# Patient Record
Sex: FEMALE | Race: WHITE | NOT HISPANIC OR LATINO | Employment: UNEMPLOYED | URBAN - METROPOLITAN AREA
[De-identification: names, ages, dates, MRNs, and addresses within clinical notes are randomized per-mention and may not be internally consistent; named-entity substitution may affect disease eponyms.]

---

## 2023-12-28 ENCOUNTER — APPOINTMENT (OUTPATIENT)
Dept: RADIOLOGY | Facility: HOSPITAL | Age: 58
End: 2023-12-28
Attending: STUDENT IN AN ORGANIZED HEALTH CARE EDUCATION/TRAINING PROGRAM
Payer: COMMERCIAL

## 2023-12-28 ENCOUNTER — HOSPITAL ENCOUNTER (EMERGENCY)
Facility: HOSPITAL | Age: 58
Discharge: HOME | End: 2023-12-28
Attending: EMERGENCY MEDICINE | Admitting: EMERGENCY MEDICINE
Payer: COMMERCIAL

## 2023-12-28 VITALS
HEART RATE: 117 BPM | WEIGHT: 262 LBS | TEMPERATURE: 98.8 F | RESPIRATION RATE: 20 BRPM | SYSTOLIC BLOOD PRESSURE: 142 MMHG | DIASTOLIC BLOOD PRESSURE: 82 MMHG | OXYGEN SATURATION: 96 %

## 2023-12-28 DIAGNOSIS — J10.1 INFLUENZA A: ICD-10-CM

## 2023-12-28 LAB
FLUAV RNA SPEC QL NAA+PROBE: POSITIVE
FLUBV RNA RESP QL NAA+PROBE: NEGATIVE
RSV RNA SPEC NAA+PROBE: NEGATIVE
SARS-COV-2 RNA RESP QL NAA+PROBE: NEGATIVE

## 2023-12-28 PROCEDURE — 99284 EMERGENCY DEPT VISIT MOD MDM: CPT | Mod: 25

## 2023-12-28 PROCEDURE — 71046 X-RAY EXAM CHEST 2 VIEWS: CPT

## 2023-12-28 PROCEDURE — 87637 SARSCOV2&INF A&B&RSV AMP PRB: CPT | Performed by: EMERGENCY MEDICINE

## 2023-12-28 RX ORDER — BENZONATATE 100 MG/1
100 CAPSULE ORAL 3 TIMES DAILY PRN
Qty: 15 CAPSULE | Refills: 0 | Status: SHIPPED | OUTPATIENT
Start: 2023-12-28 | End: 2024-01-02

## 2023-12-28 NOTE — DISCHARGE INSTRUCTIONS
You were seen today in the emergency department for your symptoms.  Your chest x-ray was reassuring.  Your laboratory test was positive for influenza A.      Pain Management at Home    You can take ibuprofen (Motrin/Advil) as needed. Take ibuprofen with food to avoid an upset stomach. You can also take famotidine (Pepcid) if you feel like the ibuprofen is giving you heartburn. If you prefer, you can substitute naproxen (Aleve/Naprosyn) for the ibuprofen. Do not take naproxen while you are taking ibuprofen. Do not take ibuprofen if you are already taking naproxen. All of these medicines are available over the counter.    You can take 600 mg of Ibuprofen (Motrin, Advil) by mouth with food every 6 to 8 hours. The maximum dose of ibuprofen is 3200 mg in a 24 hour period. Please do not take more than 3200 mg in 24 hours. Taking this medication with food can help to prevent stomach irritation. With long-term use this medication can irritate the stomach causing pain and can lead to development of a stomach ulcer. If you notice stomach pain or vomiting of coffee-ground colored vomit or blood, please be seen by a healthcare provider. Attempt to use this medication for the shortest time possible.     You can take Tessalon Pearls as needed up to 3 times per day for cough.     Follow-up with your primary care for close recheck.  Return to the emergency department if you develop any new or worsening symptoms including but not limited to fevers, coughing up blood, vomiting up blood, unable to keep food or fluids down, chest pain, difficulty breathing.

## 2023-12-28 NOTE — ED TRIAGE NOTES
"New diabetic, on oral meds, \"got sick after daniel\" with painful productive cough, chest and abdominal tenderness from coughing all night; last took mucinex this am. Pt  went to clinic and was put on Zpak and prednisone.      Triage Assessment (Adult)       Row Name 12/28/23 1520          Triage Assessment    Airway WDL WDL        Respiratory WDL    Respiratory WDL X  productive cough since 12/26; pt  on zpack        Skin Circulation/Temperature WDL    Skin Circulation/Temperature WDL WDL        Cardiac WDL    Cardiac WDL X          Peripheral/Neurovascular WDL    Peripheral Neurovascular WDL WDL        Cognitive/Neuro/Behavioral WDL    Cognitive/Neuro/Behavioral WDL WDL                     "

## 2023-12-29 NOTE — ED PROVIDER NOTES
Emergency Department Midlevel Supervisory Note     I personally saw the patient and performed a substantive portion of the visit including all aspects of the medical decision making.    ED Course:  6:09 PM Anali Garcia PA-C staffed patient with me. I agree with their assessment and plan of management, and I will see the patient.  6:11 PM I met with the patient to introduce myself, gather additional history, perform my initial exam, and discuss the plan.     Brief HPI:     Chelsie Herrera is a 58 year old female who presents for evaluation of flu-like symptoms x2 days including productive cough with pain to her chest secondary to.    Brief Physical Exam: /72   Pulse 114   Temp 98.8  F (37.1  C) (Oral)   Resp 20   Wt 118.8 kg (262 lb)   SpO2 93%   Constitutional:  Alert, in no acute distress  EYES: Conjunctivae clear  HENT:  Atraumatic, normocephalic  Respiratory:  Respirations even, unlabored, in no acute respiratory distress, lungs are clear to auscultation  Cardiovascular:  Regular rate and rhythm, good peripheral perfusion  GI: Soft, nondistended  Musculoskeletal:  No edema. No cyanosis. Range of motion major extremities intact.    Integument: Warm, Dry, No erythema, No rash.   Neurologic:  Alert & oriented, no focal deficits noted  Psych: Normal mood and affect     MDM:  Patient is a 58-year-old female who presents to the emergency department with 2 days of cough headache and fatigue.  She is mildly tachycardic but not in any respiratory distress.  Maintaining oxygen saturations in the mid to upper 90s.  Lungs are clear to auscultation.  Chest x-ray is clear.  Patient ultimately tested positive for influenza which I do think accounts for all of her symptoms.  She did not seem to have any evidence of acute complication like superimposed pneumonia, sepsis, etc.  I do not think there is a compelling indication for further lab or advanced imaging workup.  Plan was reviewed for continued supportive  measures at home and patient was in agreement with discharge plan.  Agree with remainder of ED course and plan as documented by ZANA.      1. Influenza A        Labs and Imaging:  Results for orders placed or performed during the hospital encounter of 12/28/23   XR Chest 2 Views    Impression    IMPRESSION:     The cardiac silhouette is normal in size. Hilar and mediastinal interfaces are normal.    The lungs are symmetrically inflated. There are no airspace or interstitial opacities. Punctate benign calcified granuloma in the lateral left lung.    Diaphragm curvature is normal. No pleural fluid is present.    Small thoracic spine degenerative osteophytes are present.      Symptomatic Influenza A/B, RSV, & SARS-CoV2 PCR (COVID-19) Nasopharyngeal    Specimen: Nasopharyngeal; Swab   Result Value Ref Range    Influenza A PCR Positive (A) Negative    Influenza B PCR Negative Negative    RSV PCR Negative Negative    SARS CoV2 PCR Negative Negative     I have reviewed the relevant laboratory and radiology studies    Procedures:  I was present for the key portions of this procedure: none    I, Zay Greene, am serving as a scribe to document services personally performed by Ilya Scott MD, based on my observations and the provider's statements to me. I, Ilya Scott MD, attest that Zay Greene is acting in a scribe capacity, has observed my performance of the services and has documented them in accordance with my direction.    Ilya Scott MD  [facility] EMERGENCY DEPARTMENT  [address] [place] [id number]  [phone]       Ilya Scott MD  12/29/23 2252

## 2023-12-29 NOTE — ED PROVIDER NOTES
EMERGENCY DEPARTMENT ENCOUNTER      NAME: Chelsie Herrera  AGE: 58 year old female  YOB: 1965  MRN: [id number]  EVALUATION DATE & TIME: 12/29/23 1:33 AM    PCP: Alonso Nelson    ED PROVIDER: Anali Garcia PA-C      CHIEF COMPLAINT:  Cough      FINAL IMPRESSION:  1. Influenza A          ED COURSE & MEDICAL DECISION MAKING:  Pertinent Labs & Imaging studies reviewed. (See chart for details)    MDM:   ED Course as of 12/29/23 0138   Thu Dec 28, 2023   1805 The patient is a 58-year-old female with a history of diabetes mellitus, asthma, hypertension presenting with 2 days of productive cough with greenish nonbloody sputum, sore throat, bilateral rib pain with coughing, no chest pain outside of this, no abdominal pain.  She endorses decreased appetite with slight nausea but no vomiting, no hematemesis.  Slight headache and significant fatigue.  No diarrhea, constipation, blood in stool, significant myalgias.  No fevers at home.  No dysuria or hematuria.  No vision changes.  She is not on blood thinning medication.  She does take montelukast and Advair twice daily for her asthma.  No Tylenol or ibuprofen taken for symptoms prior to arrival.   1812 Initial vital signs reviewed and significant for tachycardia and SpO2 93% on room air although patient is wearing an N95 mask.  On exam, the patient appears uncomfortable but is nontoxic-appearing.  Exam with lungs clear to auscultation throughout.  Mucous membranes are moist, no posterior oropharyngeal erythema, edema, exudate.  Uvula is midline.  She is breathing easily and comfortably on room air.  No stridor.  Lungs clear to auscultation throughout with no wheezing, rales, rhonchi.  She does have reproducible bilateral chest wall tenderness to palpation.  Abdomen soft, nontender.   1813 Differential diagnosis includes viral illness including influenza A/B, RSV, COVID-19, costochondritis, musculoskeletal chest wall strain, pneumonia although less  likely given symptom duration.  Low clinical suspicion for ACS, pneumothorax, esophageal rupture, CHF, anemia. Low suspicion for AAA/dissection given history and exam.  Low clinical suspicion for sepsis.   1815 The patient tested positive for influenza A.  Chest x-ray reassuring with no infiltrate suggestive of pneumonia.   1816 Discussed options for management with the patient and decided on prescription for Tessalon Perles to take for her cough which is keeping her up at night as well as instructions to take ibuprofen as needed for symptoms.  Using shared decision making, patient declines Tamiflu prescription after discussing risks and benefits of Tamiflu.  Patient does have a primary care clinic that she follows with and she does have an appointment already scheduled with them for her next week.  Plan for discharge home with prescription for Tessalon Perles and close outpatient follow-up with primary care clinician.  Patient verbalized understanding and is in agreement with this plan strict return precautions discussed.   1818 I did consult the ED pharmacist regarding appropriate over-the-counter medications for the patient.   1818 I staffed the patient with Dr. Wojciech Scott.       At the conclusion of the encounter I discussed the results of all of the tests and the disposition. The questions were answered. The patient or family acknowledged understanding and was agreeable with the care plan.     History:  Supplemental history from: Documented in chart, if applicable  External Record(s) reviewed: Documented in chart, if applicable.    Work Up:  Chart documentation includes differential considered and any EKGs or imaging independently interpreted by provider, where specified.  In additional to work up documented, I considered the following work up: Documented in chart, if applicable.    External consultation:  Discussion of management with another provider: Documented in chart, if applicable    Complicating  factors:  Care impacted by chronic illness: Dementia, Hypertension, and Other: asthma  Care affected by social determinants of health: N/A    Disposition considerations: Discharge. I recommended the patient continue their current prescription strength medication(s): per their medication list. N/A.          At the conclusion of the encounter I discussed the results of all the tests and the disposition. The questions were answered to the best of my ability. The patient and/or family acknowledged understanding and was agreeable with the care plan.          MEDICATIONS GIVEN IN THE EMERGENCY:  Medications - No data to display    NEW PRESCRIPTIONS STARTED AT TODAY'S ER VISIT  Discharge Medication List as of 12/28/2023  6:27 PM        START taking these medications    Details   benzonatate (TESSALON) 100 MG capsule Take 1 capsule (100 mg) by mouth 3 times daily as needed for cough, Disp-15 capsule, R-0, Local Print                =================================================================    HPI    Patient information was obtained from: the patient     Use of Intrepreter: N/A         Chelsie Herrera is a 58 year old female with pertinent medical history of diabetes mellitus, asthma, hypertension who presents to the emergency department for evaluation of cough.    The patient presents with 2 days of productive cough with greenish sputum.  No hemoptysis.  Associated symptoms include sore throat, decreased appetite, sore hips, malaise/fatigue, headache, nausea but no vomiting.  No dysuria, diarrhea, constipation, myalgias, fevers, hematemesis.  No vision changes.  The patient took a single dose of Mucinex this morning without significant relief although it did help some.  She has not taken any ibuprofen or Tylenol for her symptoms as of yet.  She is not on blood thinning medications.  No shortness of breath.  She does have a history of asthma taking Advair twice daily as prescribed as well as montelukast.  No recent  surgeries, estrogen hormone use, prolonged immobilizations recently.  Her  is at home with similar symptoms.      PAST MEDICAL HISTORY:  No past medical history on file.    PAST SURGICAL HISTORY:  No past surgical history on file.    CURRENT MEDICATIONS:    Prior to Admission Medications   Prescriptions Last Dose Informant Patient Reported? Taking?   HYDROcodone-acetaminophen 5-325 mg per tablet   Yes No   Sig: [HYDROCODONE-ACETAMINOPHEN 5-325 MG PER TABLET] Take 1 tablet by mouth every 4 (four) hours as needed for pain.   cetirizine (ZYRTEC) 10 MG tablet   Yes No   Sig: [CETIRIZINE (ZYRTEC) 10 MG TABLET] Take 10 mg by mouth daily.   clobetasol (TEMOVATE) 0.05 % ointment   Yes No   Sig: [CLOBETASOL (TEMOVATE) 0.05 % OINTMENT] Apply topically 2 (two) times a day.   efinaconazole 10 % Lewis   Yes No   Sig: [EFINACONAZOLE 10 % LEWIS] Apply topically daily.   ergocalciferol (VITAMIN D2) 50,000 unit capsule   Yes No   Sig: [ERGOCALCIFEROL (VITAMIN D2) 50,000 UNIT CAPSULE] Take 50,000 Units by mouth once a week.   fenofibrate micronized (LOFIBRA) 200 MG capsule   Yes No   Sig: [FENOFIBRATE MICRONIZED (LOFIBRA) 200 MG CAPSULE] Take 200 mg by mouth every morning before breakfast.   fluconazole (DIFLUCAN) 200 MG tablet   Yes No   Sig: [FLUCONAZOLE (DIFLUCAN) 200 MG TABLET] Take 200 mg by mouth daily.   fluticasone (FLONASE) 50 mcg/actuation nasal spray   Yes No   Sig: [FLUTICASONE (FLONASE) 50 MCG/ACTUATION NASAL SPRAY] 1 spray into each nostril daily.   fluticasone-salmeterol (ADVAIR) 250-50 mcg/dose DISKUS   Yes No   Sig: [FLUTICASONE-SALMETEROL (ADVAIR) 250-50 MCG/DOSE DISKUS] Inhale 1 puff 2 (two) times a day.   hydrochlorothiazide (HYDRODIURIL) 25 MG tablet   Yes No   Sig: [HYDROCHLOROTHIAZIDE (HYDRODIURIL) 25 MG TABLET] Take 25 mg by mouth daily.   lidocaine-hydrocortisone-aloe 2.8-0.55 % Gel   No No   Sig: [LIDOCAINE-HYDROCORTISONE-ALOE 2.8-0.55 % GEL] Insert 1 application into the rectum 2 (two) times a day as  needed.   losartan (COZAAR) 25 MG tablet   Yes No   Sig: [LOSARTAN (COZAAR) 25 MG TABLET] Take 25 mg by mouth daily.   montelukast (SINGULAIR) 10 mg tablet   Yes No   Sig: [MONTELUKAST (SINGULAIR) 10 MG TABLET] Take 10 mg by mouth bedtime.   naproxen (NAPROSYN) 500 MG tablet   Yes No   Sig: [NAPROXEN (NAPROSYN) 500 MG TABLET] Take 500 mg by mouth 2 (two) times a day with meals.   simvastatin (ZOCOR) 20 MG tablet   Yes No   Sig: [SIMVASTATIN (ZOCOR) 20 MG TABLET] Take 20 mg by mouth bedtime.      Facility-Administered Medications: None       ALLERGIES:  Allergies   Allergen Reactions    Lisinopril Unknown       FAMILY HISTORY:  No family history on file.    SOCIAL HISTORY:        VITALS:    First Vitals:  Patient Vitals for the past 24 hrs:   BP Temp Temp src Pulse Resp SpO2 Weight   12/28/23 1812 (!) 142/82 -- -- 117 -- 96 % --   12/28/23 1525 -- -- -- -- -- -- 118.8 kg (262 lb)   12/28/23 1518 120/72 98.8  F (37.1  C) Oral 114 20 93 % --       Patient Vitals for the past 24 hrs:   BP Temp Temp src Pulse Resp SpO2 Weight   12/28/23 1812 (!) 142/82 -- -- 117 -- 96 % --   12/28/23 1525 -- -- -- -- -- -- 118.8 kg (262 lb)   12/28/23 1518 120/72 98.8  F (37.1  C) Oral 114 20 93 % --       PHYSICAL EXAM  VITAL SIGNS: BP (!) 142/82   Pulse 117   Temp 98.8  F (37.1  C) (Oral)   Resp 20   Wt 118.8 kg (262 lb)   SpO2 96%    GENERAL: Awake, alert, answering questions appropriately, slightly uncomfortable appearing resting in hospital bed in no acute distress.  HEENT: Posterior oropharynx clear with no erythema, no exudate. No tonsillar hypertrophy. Uvula midline. Tolerating secretions, no drooling.  Dukas membranes moist.  SPEECH:  Easy to understand speech, Normal volume and nayely. Normal phonation.  PULMONARY: No respiratory distress, Breathing comfortably on room air. Lungs clear to auscultation bilaterally.  CARDIOVASCULAR: Regular rate and rhythm, radial pulses present, symmetric, and normal.  ABDOMINAL: Soft,  Nondistended, Nontender, No rebound or guarding, No palpable masses.  CHEST: Mild bilateral reproducible tenderness to palpation overlying chest wall.  EXTREMITIES: Extremities are warm and well perfused. No lower extremity edema.  NEUROLOGIC: Moving all extremities spontaneously.   SKIN: Exposed areas of skin warm, dry, no rashes.  PSYCH: Normal mood and affect.           RADIOLOGY/LAB:  Reviewed all pertinent imaging. Please see official radiology report.  All pertinent labs reviewed and interpreted.  Results for orders placed or performed during the hospital encounter of 12/28/23   XR Chest 2 Views    Impression    IMPRESSION:     The cardiac silhouette is normal in size. Hilar and mediastinal interfaces are normal.    The lungs are symmetrically inflated. There are no airspace or interstitial opacities. Punctate benign calcified granuloma in the lateral left lung.    Diaphragm curvature is normal. No pleural fluid is present.    Small thoracic spine degenerative osteophytes are present.      Symptomatic Influenza A/B, RSV, & SARS-CoV2 PCR (COVID-19) Nasopharyngeal    Specimen: Nasopharyngeal; Swab   Result Value Ref Range    Influenza A PCR Positive (A) Negative    Influenza B PCR Negative Negative    RSV PCR Negative Negative    SARS CoV2 PCR Negative Negative               Anali Garcia PA-C  Emergency Medicine  [facility] EMERGENCY DEPARTMENT  [address] [id number] [phone]  Dept: [phone]     Anali Garcia PA-C  12/29/23 0138